# Patient Record
Sex: MALE | Race: BLACK OR AFRICAN AMERICAN | Employment: UNEMPLOYED | URBAN - METROPOLITAN AREA
[De-identification: names, ages, dates, MRNs, and addresses within clinical notes are randomized per-mention and may not be internally consistent; named-entity substitution may affect disease eponyms.]

---

## 2022-05-05 ENCOUNTER — HOSPITAL ENCOUNTER (EMERGENCY)
Age: 27
Discharge: HOME | End: 2022-05-05
Attending: EMERGENCY MEDICINE

## 2022-05-05 VITALS
HEART RATE: 110 BPM | SYSTOLIC BLOOD PRESSURE: 136 MMHG | DIASTOLIC BLOOD PRESSURE: 101 MMHG | OXYGEN SATURATION: 98 % | RESPIRATION RATE: 21 BRPM | TEMPERATURE: 98.7 F

## 2022-05-05 DIAGNOSIS — Z00.8 MEDICAL CLEARANCE FOR INCARCERATION: Primary | ICD-10-CM

## 2022-05-05 PROCEDURE — 99282 EMERGENCY DEPT VISIT SF MDM: CPT

## 2022-05-05 ASSESSMENT — PAIN - FUNCTIONAL ASSESSMENT: PAIN_FUNCTIONAL_ASSESSMENT: NONE - DENIES PAIN
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
## 2022-05-05 NOTE — ED NOTES
D/c instructions reviewed with pt. All questions answered. Pt a/o and d/c with [employer] at this time.       Kylah Vanessa RN  05/05/22 [id number]
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
## 2022-10-10 NOTE — ED NOTES
OSVALDO Vasquez at bedside assessing patient.       Dianelys Smith RN  05/05/22 1720 Clofazimine Pregnancy And Lactation Text: This medication is Pregnancy Category C and isn't considered safe during pregnancy. It is excreted in breast milk.

## 2023-04-17 ENCOUNTER — HOSPITAL ENCOUNTER (OUTPATIENT)
Dept: PSYCHIATRY | Age: 28
Setting detail: THERAPIES SERIES
Discharge: HOME | End: 2023-04-17

## 2023-04-17 NOTE — PROGRESS NOTES
[address] [facility]        Individual  Progress Note    Location: [x] [place] [] Corinna mansi                   Patient Name: Nima Burkett   : 1995     Case # :  Intake   Therapist: MAHIN Alanis          Did not show         Electronically signed by MAHIN Alanis on 1889 at 11:47 AM         Simba Odonnell [facility], LSWILIAM, NICOLECS